# Patient Record
(demographics unavailable — no encounter records)

---

## 2024-11-12 NOTE — REASON FOR VISIT
[Initial] : an initial visit [TextBox_44] : patient in good health who who had a ct done that showed 9 mm nodule

## 2024-11-12 NOTE — HISTORY OF PRESENT ILLNESS
[TextBox_4] : non smoker from [place], hence she is here expecting me to have her records.  no ct  report was sent and although she was asked to bring her images, she expected the referring physician to do this. i sat with her and discussed nodules but explained I could not do anything today without the images she is a non smoker in good health we will work to get the images